# Patient Record
Sex: MALE | Race: WHITE | NOT HISPANIC OR LATINO | Employment: FULL TIME | URBAN - METROPOLITAN AREA
[De-identification: names, ages, dates, MRNs, and addresses within clinical notes are randomized per-mention and may not be internally consistent; named-entity substitution may affect disease eponyms.]

---

## 2019-10-09 ENCOUNTER — TRANSCRIBE ORDERS (OUTPATIENT)
Dept: URGENT CARE | Facility: CLINIC | Age: 59
End: 2019-10-09

## 2019-10-09 ENCOUNTER — APPOINTMENT (OUTPATIENT)
Dept: URGENT CARE | Facility: CLINIC | Age: 59
End: 2019-10-09

## 2019-10-09 VITALS
WEIGHT: 170.6 LBS | HEIGHT: 71 IN | DIASTOLIC BLOOD PRESSURE: 80 MMHG | SYSTOLIC BLOOD PRESSURE: 142 MMHG | BODY MASS INDEX: 23.88 KG/M2

## 2019-10-09 DIAGNOSIS — Z00.8 ENCOUNTER FOR BIOMETRIC SCREENING: ICD-10-CM

## 2019-10-09 DIAGNOSIS — Z00.8 ENCOUNTER FOR BIOMETRIC SCREENING: Primary | ICD-10-CM

## 2019-10-09 DIAGNOSIS — Z23 NEED FOR INFLUENZA VACCINATION: Primary | ICD-10-CM

## 2019-10-09 PROCEDURE — 80323 ALKALOIDS NOS: CPT | Performed by: NURSE PRACTITIONER

## 2019-10-15 LAB
COTININE SERPL-MCNC: NORMAL NG/ML
NICOTINE SERPL-MCNC: NORMAL NG/ML

## 2019-10-16 ENCOUNTER — TELEPHONE (OUTPATIENT)
Dept: URGENT CARE | Facility: CLINIC | Age: 59
End: 2019-10-16

## 2019-11-14 ENCOUNTER — TELEPHONE (OUTPATIENT)
Dept: FAMILY MEDICINE CLINIC | Facility: CLINIC | Age: 59
End: 2019-11-14

## 2019-11-14 NOTE — TELEPHONE ENCOUNTER
Please attempt to call pt and see if he would like to establish care  He was recently seen at urgent care and they updated PCP field with one of our providers   Thank you

## 2019-11-19 ENCOUNTER — TELEPHONE (OUTPATIENT)
Dept: FAMILY MEDICINE CLINIC | Facility: CLINIC | Age: 59
End: 2019-11-19

## 2019-11-19 NOTE — TELEPHONE ENCOUNTER
CALLED PT LET HIM A VOICEMAIL REGARDING HIS VISIT TO THE URGENT CARE AND WANTING TO ESTABLISH CARE WITH OUR OFFICE

## 2021-04-08 DIAGNOSIS — Z23 ENCOUNTER FOR IMMUNIZATION: ICD-10-CM

## 2022-05-24 ENCOUNTER — OFFICE VISIT (OUTPATIENT)
Dept: URGENT CARE | Facility: MEDICAL CENTER | Age: 62
End: 2022-05-24
Payer: COMMERCIAL

## 2022-05-24 VITALS
TEMPERATURE: 98.7 F | RESPIRATION RATE: 16 BRPM | SYSTOLIC BLOOD PRESSURE: 131 MMHG | DIASTOLIC BLOOD PRESSURE: 77 MMHG | BODY MASS INDEX: 23.1 KG/M2 | OXYGEN SATURATION: 96 % | HEART RATE: 86 BPM | WEIGHT: 165 LBS | HEIGHT: 71 IN

## 2022-05-24 DIAGNOSIS — B35.3 TINEA PEDIS OF RIGHT FOOT: Primary | ICD-10-CM

## 2022-05-24 PROCEDURE — 99213 OFFICE O/P EST LOW 20 MIN: CPT | Performed by: EMERGENCY MEDICINE

## 2022-05-24 RX ORDER — CLOTRIMAZOLE 1 %
CREAM (GRAM) TOPICAL 2 TIMES DAILY
Qty: 30 G | Refills: 0 | Status: SHIPPED | OUTPATIENT
Start: 2022-05-24

## 2022-05-24 NOTE — PATIENT INSTRUCTIONS
Athlete's Foot   WHAT YOU NEED TO KNOW:   Athlete's foot is a foot infection caused by a fungus  DISCHARGE INSTRUCTIONS:   Return to the emergency department if:   You have a fever or chills  You have red streaks going up your leg  Contact your healthcare provider if:   Your infection spreads to other parts of your body  Your infection is not better in 14 days or is not completely gone in 90 days  The skin on your foot or leg is red and hot  You have questions or concerns about your condition or care  Medicines:   Antifungal medicine  may be given as a cream or pill  You may need a doctor's order for this medicine  Take the medicine until it is gone, even if your feet look like they are healed  Take your medicine as directed  Contact your healthcare provider if you think your medicine is not helping or if you have side effects  Tell him or her if you are allergic to any medicine  Keep a list of the medicines, vitamins, and herbs you take  Include the amounts, and when and why you take them  Bring the list or the pill bottles to follow-up visits  Carry your medicine list with you in case of an emergency  Follow up with your doctor as directed:  Write down your questions so you remember to ask them during your visits  Prevent the spread of athlete's foot:   Prevent the spread of this infection to other parts of your body  When you shower, dry your groin area and other parts of your body before you dry your feet  Keep your feet clean and dry  Wash your feet each day and dry them well, especially between your toes  After your feet are dry, put powder on your feet and between your toes  Wear clean cotton or wool socks each day  Put your socks on first so you do not spread the infection to other areas of your body  Wear sandals, canvas tennis shoes, or other shoes that allow air to flow to your feet  This helps keep your feet dry   Do not use shoes that are tight, or made of plastic or rubber  Soak your feet in an astringent (drying) solution as directed  if you have blisters  You may need to do this for 20 to 30 minutes, 2 times each day to help dry out the blisters  Wear shoes in public areas  Wear shower shoes or sandals in warm, damp areas  This includes shower stalls, near swimming pools, and locker rooms  Do not share socks or shoes  Do not use public swimming pools  © Copyright [employer] 2022 Information is for End User's use only and may not be sold, redistributed or otherwise used for commercial purposes  All illustrations and images included in CareNotes® are the copyrighted property of [employer]  or [facility] Brayan Kline   The above information is an  only  It is not intended as medical advice for individual conditions or treatments  Talk to your doctor, nurse or pharmacist before following any medical regimen to see if it is safe and effective for you

## 2022-05-24 NOTE — PROGRESS NOTES
[facility]        NAME: Aristeo Duenas is a 62 y o  male  : 1960    MRN: [id number]  DATE: May 24, 2022  TIME: 5:41 PM    Assessment and Plan   Tinea pedis of right foot [B35 3]  1  Tinea pedis of right foot           Patient Instructions     Athlete's Foot   WHAT YOU NEED TO KNOW:   Athlete's foot is a foot infection caused by a fungus  DISCHARGE INSTRUCTIONS:   Return to the emergency department if:   · You have a fever or chills  · You have red streaks going up your leg  Contact your healthcare provider if:   · Your infection spreads to other parts of your body  · Your infection is not better in 14 days or is not completely gone in 90 days  · The skin on your foot or leg is red and hot  · You have questions or concerns about your condition or care  Medicines:   · Antifungal medicine  may be given as a cream or pill  You may need a doctor's order for this medicine  Take the medicine until it is gone, even if your feet look like they are healed  · Take your medicine as directed  Contact your healthcare provider if you think your medicine is not helping or if you have side effects  Tell him or her if you are allergic to any medicine  Keep a list of the medicines, vitamins, and herbs you take  Include the amounts, and when and why you take them  Bring the list or the pill bottles to follow-up visits  Carry your medicine list with you in case of an emergency  Follow up with your doctor as directed:  Write down your questions so you remember to ask them during your visits  Prevent the spread of athlete's foot:   · Prevent the spread of this infection to other parts of your body  When you shower, dry your groin area and other parts of your body before you dry your feet  · Keep your feet clean and dry  Wash your feet each day and dry them well, especially between your toes  After your feet are dry, put powder on your feet and between your toes   Wear clean cotton or wool socks each day  Put your socks on first so you do not spread the infection to other areas of your body  Wear sandals, canvas tennis shoes, or other shoes that allow air to flow to your feet  This helps keep your feet dry  Do not use shoes that are tight, or made of plastic or rubber  · Soak your feet in an astringent (drying) solution as directed  if you have blisters  You may need to do this for 20 to 30 minutes, 2 times each day to help dry out the blisters  · Wear shoes in public areas  Wear shower shoes or sandals in warm, damp areas  This includes shower stalls, near swimming pools, and locker rooms  Do not share socks or shoes  Do not use public swimming pools  © Copyright [employer] 2022 Information is for End User's use only and may not be sold, redistributed or otherwise used for commercial purposes  All illustrations and images included in CareNotes® are the copyrighted property of [employer] M [employer] Inc  or [address] [place], [place] [address]kee rj   The above information is an  only  It is not intended as medical advice for individual conditions or treatments  Talk to your doctor, nurse or pharmacist before following any medical regimen to see if it is safe and effective for you  Follow up with PCP in 3-5 days  Proceed to  ER if symptoms worsen  Chief Complaint     Chief Complaint   Patient presents with    Toe Pain     Right third toe pain x 5 days  Toe is swollen and red  Denies injury  History of Present Illness       62-year-old male presents today for evaluation of right 3rd toe pain x5 days  He states the pain started after he was doing some yd work  He denies injury  He states he had heavy boots and socks on  He has not had these symptoms previously  No fever  He has not tried anything for his symptoms  Review of Systems   Review of Systems   Constitutional: Negative for chills, fatigue and fever     HENT: Negative for postnasal drip, sore throat and trouble swallowing  Respiratory: Negative for chest tightness and shortness of breath  Cardiovascular: Negative for leg swelling  Gastrointestinal: Negative for abdominal pain  Genitourinary: Negative for dysuria  Musculoskeletal: Negative for back pain  Skin: Positive for rash (Right 3rd toe redness)  Allergic/Immunologic: Negative for immunocompromised state  Neurological: Negative for dizziness, light-headedness and headaches  Psychiatric/Behavioral: Negative for confusion  Current Medications     No current outpatient medications on file  Current Allergies     Allergies as of 05/24/2022    (No Known Allergies)            The following portions of the patient's history were reviewed and updated as appropriate: allergies, current medications, past family history, past medical history, past social history, past surgical history and problem list      History reviewed  No pertinent past medical history  History reviewed  No pertinent surgical history  No family history on file  Medications have been verified  Objective   /77   Pulse 86   Temp 98 7 °F (37 1 °C)   Resp 16   Ht 5' 11" (1 803 m)   Wt 74 8 kg (165 lb)   SpO2 96%   BMI 23 01 kg/m²        Physical Exam     Physical Exam  Vitals and nursing note reviewed  Constitutional:       Appearance: Normal appearance  He is well-developed  HENT:      Head: Normocephalic and atraumatic  Right Ear: Tympanic membrane, ear canal and external ear normal       Left Ear: Tympanic membrane, ear canal and external ear normal       Nose: Nose normal       Mouth/Throat:      Mouth: Mucous membranes are moist       Pharynx: Uvula midline  Eyes:      Pupils: Pupils are equal, round, and reactive to light  Cardiovascular:      Rate and Rhythm: Normal rate and regular rhythm  Pulmonary:      Effort: Pulmonary effort is normal       Breath sounds: Normal breath sounds     Abdominal:      General: Bowel sounds are normal       Palpations: Abdomen is soft  Tenderness: There is no abdominal tenderness  Musculoskeletal:         General: Normal range of motion  Cervical back: Normal range of motion  Skin:     General: Skin is warm and dry  Capillary Refill: Capillary refill takes less than 2 seconds  Comments: Erythema present on the right 3rd toe with excoriation and flaking present between the 3rd and 4th digits  No lymphangitis streaking  Minimally tender to palpation  Range of motion of the toes are normal   Capillary refill is less than 2 seconds  Neurological:      General: No focal deficit present  Mental Status: He is alert and oriented to person, place, and time  Comments: Patient moving all extremities equally, no focal neuro deficits noted         Psychiatric:         Mood and Affect: Mood normal          Behavior: Behavior normal

## 2024-08-29 ENCOUNTER — OFFICE VISIT (OUTPATIENT)
Dept: FAMILY MEDICINE CLINIC | Facility: CLINIC | Age: 64
End: 2024-08-29
Payer: COMMERCIAL

## 2024-08-29 VITALS
HEIGHT: 71 IN | BODY MASS INDEX: 22.26 KG/M2 | WEIGHT: 159 LBS | OXYGEN SATURATION: 97 % | DIASTOLIC BLOOD PRESSURE: 70 MMHG | SYSTOLIC BLOOD PRESSURE: 118 MMHG | HEART RATE: 71 BPM

## 2024-08-29 DIAGNOSIS — Z23 NEED FOR TETANUS BOOSTER: Primary | ICD-10-CM

## 2024-08-29 DIAGNOSIS — Z12.5 PROSTATE CANCER SCREENING: ICD-10-CM

## 2024-08-29 DIAGNOSIS — Z12.5 SCREENING FOR PROSTATE CANCER: ICD-10-CM

## 2024-08-29 DIAGNOSIS — Z12.11 SCREENING FOR COLON CANCER: ICD-10-CM

## 2024-08-29 DIAGNOSIS — Z23 ENCOUNTER FOR IMMUNIZATION: ICD-10-CM

## 2024-08-29 DIAGNOSIS — Z00.00 ANNUAL PHYSICAL EXAM: ICD-10-CM

## 2024-08-29 DIAGNOSIS — R13.10 DYSPHAGIA, UNSPECIFIED TYPE: ICD-10-CM

## 2024-08-29 DIAGNOSIS — Z11.4 SCREENING FOR HIV (HUMAN IMMUNODEFICIENCY VIRUS): ICD-10-CM

## 2024-08-29 DIAGNOSIS — Z11.59 NEED FOR HEPATITIS C SCREENING TEST: ICD-10-CM

## 2024-08-29 DIAGNOSIS — Z23 NEED FOR SHINGLES VACCINE: ICD-10-CM

## 2024-08-29 PROCEDURE — 90750 HZV VACC RECOMBINANT IM: CPT

## 2024-08-29 PROCEDURE — 99386 PREV VISIT NEW AGE 40-64: CPT | Performed by: FAMILY MEDICINE

## 2024-08-29 PROCEDURE — 3725F SCREEN DEPRESSION PERFORMED: CPT | Performed by: FAMILY MEDICINE

## 2024-08-29 PROCEDURE — 90471 IMMUNIZATION ADMIN: CPT

## 2024-08-29 PROCEDURE — 90472 IMMUNIZATION ADMIN EACH ADD: CPT

## 2024-08-29 PROCEDURE — 90715 TDAP VACCINE 7 YRS/> IM: CPT

## 2024-08-29 NOTE — ASSESSMENT & PLAN NOTE
Often with eating quickly, occurs 1-2 per month often with rice, now eating slower and this has improved, check labs today, refer to GI to consider endoscopy

## 2024-08-29 NOTE — PROGRESS NOTES
Adult Annual Physical  Name: Juanpablo Headley      : 1960      MRN: [id number]  Encounter Provider: Juan Manuel Sandoval MD  Encounter Date: 2024   Encounter department: [facility]    Assessment & Plan   1. Need for tetanus booster  2. Annual physical exam  -     Comprehensive metabolic panel; Future  -     Hemoglobin A1C; Future  -     Lipid panel; Future  -     Hepatitis C antibody; Future  -     HIV 1/2 AG/AB w Reflex [facility] for 2 yr old and above; Future  3. Need for hepatitis C screening test  4. Screening for HIV (human immunodeficiency virus)  5. Screening for colon cancer  -     Ambulatory Referral to Gastroenterology; Future  6. Screening for prostate cancer  7. Dysphagia, unspecified type  Assessment & Plan:  Often with eating quickly, occurs 1-2 per month often with rice, now eating slower and this has improved, check labs today, refer to GI to consider endoscopy  Orders:  -     Ambulatory Referral to Gastroenterology; Future  -     CBC and differential; Future  8. Prostate cancer screening  -     PSA, Total Screen; Future  9. Need for shingles vaccine  10. Encounter for immunization  -     TDAP VACCINE GREATER THAN OR EQUAL TO 6YO IM  -     Zoster Vaccine Recombinant IM    Immunizations and preventive care screenings were discussed with patient today. Appropriate education was printed on patient's after visit summary.    Discussed risks and benefits of prostate cancer screening. We discussed the controversial history of PSA screening for prostate cancer in the [place] as well as the risk of over detection and over treatment of prostate cancer by way of PSA screening.  The patient understands that PSA blood testing is an imperfect way to screen for prostate cancer and that elevated PSA levels in the blood may also be caused by infection, inflammation, prostatic trauma or manipulation, urological procedures, or by benign prostatic enlargement.    The role  "of the digital rectal examination in prostate cancer screening was also discussed and I discussed with him that there is large interobserver variability in the findings of digital rectal examination.    Overall doing well has not seen a doctor in many years, check and refer for routine screneings.    Advised to reduce alchohol consumption  Counseling:  Alcohol/drug use: discussed moderation in alcohol intake, the recommendations for healthy alcohol use, and avoidance of illicit drug use.  Dental Health: discussed importance of regular tooth brushing, flossing, and dental visits.  Injury prevention: discussed safety/seat belts, safety helmets, smoke detectors, carbon dioxide detectors, and smoking near bedding or upholstery.  Sexual health: discussed sexually transmitted diseases, partner selection, use of condoms, avoidance of unintended pregnancy, and contraceptive alternatives.  Exercise: the importance of regular exercise/physical activity was discussed. Recommend exercise 3-5 times per week for at least 30 minutes.       Depression Screening and Follow-up Plan: Patient was screened for depression during today's encounter. They screened negative with a PHQ-2 score of 0.        History of Present Illness     63 year old male presenting for physical    Overall doing well reports difficulty swallowing over the past few motnhs especially with rice    Feels as if food is stuck in throat has to cough it out or sometimes resolves with water        Adult Annual Physical  Review of Systems   Constitutional:  Negative for activity change and appetite change.   Respiratory:  Negative for apnea and chest tightness.    Cardiovascular:  Negative for chest pain and palpitations.   Gastrointestinal:  Negative for abdominal distention and abdominal pain.   Musculoskeletal:  Negative for arthralgias and back pain.         Objective     /70   Pulse 71   Ht 5' 11\" (1.803 m)   Wt 72.1 kg (159 lb)   SpO2 97%   BMI 22.18 " kg/m²     Physical Exam  Constitutional:       Appearance: Normal appearance.   Cardiovascular:      Rate and Rhythm: Normal rate and regular rhythm.      Pulses: Normal pulses.      Heart sounds: Normal heart sounds.   Pulmonary:      Effort: Pulmonary effort is normal.      Breath sounds: Normal breath sounds.   Abdominal:      General: Abdomen is flat.   Musculoskeletal:         General: Normal range of motion.   Neurological:      General: No focal deficit present.      Mental Status: He is alert and oriented to person, place, and time.

## 2024-08-30 ENCOUNTER — APPOINTMENT (OUTPATIENT)
Dept: LAB | Facility: MEDICAL CENTER | Age: 64
End: 2024-08-30
Payer: COMMERCIAL

## 2024-08-30 DIAGNOSIS — Z00.00 ANNUAL PHYSICAL EXAM: ICD-10-CM

## 2024-08-30 DIAGNOSIS — Z12.5 PROSTATE CANCER SCREENING: ICD-10-CM

## 2024-08-30 DIAGNOSIS — R13.10 DYSPHAGIA, UNSPECIFIED TYPE: ICD-10-CM

## 2024-08-30 LAB
ALBUMIN SERPL BCG-MCNC: 4 G/DL (ref 3.5–5)
ALP SERPL-CCNC: 61 U/L (ref 34–104)
ALT SERPL W P-5'-P-CCNC: 23 U/L (ref 7–52)
ANION GAP SERPL CALCULATED.3IONS-SCNC: 8 MMOL/L (ref 4–13)
AST SERPL W P-5'-P-CCNC: 19 U/L (ref 13–39)
BASOPHILS # BLD AUTO: 0.02 THOUSANDS/ÂΜL (ref 0–0.1)
BASOPHILS NFR BLD AUTO: 0 % (ref 0–1)
BILIRUB SERPL-MCNC: 0.86 MG/DL (ref 0.2–1)
BUN SERPL-MCNC: 8 MG/DL (ref 5–25)
CALCIUM SERPL-MCNC: 8.8 MG/DL (ref 8.4–10.2)
CHLORIDE SERPL-SCNC: 104 MMOL/L (ref 96–108)
CHOLEST SERPL-MCNC: 175 MG/DL
CO2 SERPL-SCNC: 26 MMOL/L (ref 21–32)
CREAT SERPL-MCNC: 0.9 MG/DL (ref 0.6–1.3)
EOSINOPHIL # BLD AUTO: 0.09 THOUSAND/ÂΜL (ref 0–0.61)
EOSINOPHIL NFR BLD AUTO: 1 % (ref 0–6)
ERYTHROCYTE [DISTWIDTH] IN BLOOD BY AUTOMATED COUNT: 13 % (ref 11.6–15.1)
EST. AVERAGE GLUCOSE BLD GHB EST-MCNC: 97 MG/DL
GFR SERPL CREATININE-BSD FRML MDRD: 91 ML/MIN/1.73SQ M
GLUCOSE P FAST SERPL-MCNC: 95 MG/DL (ref 65–99)
HBA1C MFR BLD: 5 %
HCT VFR BLD AUTO: 45.8 % (ref 36.5–49.3)
HCV AB SER QL: NORMAL
HDLC SERPL-MCNC: 31 MG/DL
HGB BLD-MCNC: 15.7 G/DL (ref 12–17)
HIV 1+2 AB+HIV1 P24 AG SERPL QL IA: NORMAL
HIV 2 AB SERPL QL IA: NORMAL
HIV1 AB SERPL QL IA: NORMAL
HIV1 P24 AG SERPL QL IA: NORMAL
IMM GRANULOCYTES # BLD AUTO: 0.01 THOUSAND/UL (ref 0–0.2)
IMM GRANULOCYTES NFR BLD AUTO: 0 % (ref 0–2)
LDLC SERPL CALC-MCNC: 119 MG/DL (ref 0–100)
LYMPHOCYTES # BLD AUTO: 1.15 THOUSANDS/ÂΜL (ref 0.6–4.47)
LYMPHOCYTES NFR BLD AUTO: 15 % (ref 14–44)
MCH RBC QN AUTO: 34.3 PG (ref 26.8–34.3)
MCHC RBC AUTO-ENTMCNC: 34.3 G/DL (ref 31.4–37.4)
MCV RBC AUTO: 100 FL (ref 82–98)
MONOCYTES # BLD AUTO: 0.65 THOUSAND/ÂΜL (ref 0.17–1.22)
MONOCYTES NFR BLD AUTO: 9 % (ref 4–12)
NEUTROPHILS # BLD AUTO: 5.7 THOUSANDS/ÂΜL (ref 1.85–7.62)
NEUTS SEG NFR BLD AUTO: 75 % (ref 43–75)
NONHDLC SERPL-MCNC: 144 MG/DL
NRBC BLD AUTO-RTO: 0 /100 WBCS
PLATELET # BLD AUTO: 228 THOUSANDS/UL (ref 149–390)
PMV BLD AUTO: 9.2 FL (ref 8.9–12.7)
POTASSIUM SERPL-SCNC: 4.4 MMOL/L (ref 3.5–5.3)
PROT SERPL-MCNC: 6.2 G/DL (ref 6.4–8.4)
PSA SERPL-MCNC: 2.34 NG/ML (ref 0–4)
RBC # BLD AUTO: 4.58 MILLION/UL (ref 3.88–5.62)
SODIUM SERPL-SCNC: 138 MMOL/L (ref 135–147)
TRIGL SERPL-MCNC: 123 MG/DL
WBC # BLD AUTO: 7.62 THOUSAND/UL (ref 4.31–10.16)

## 2024-08-30 PROCEDURE — 80053 COMPREHEN METABOLIC PANEL: CPT

## 2024-08-30 PROCEDURE — 85025 COMPLETE CBC W/AUTO DIFF WBC: CPT

## 2024-08-30 PROCEDURE — 87389 HIV-1 AG W/HIV-1&-2 AB AG IA: CPT

## 2024-08-30 PROCEDURE — 36415 COLL VENOUS BLD VENIPUNCTURE: CPT

## 2024-08-30 PROCEDURE — G0103 PSA SCREENING: HCPCS

## 2024-08-30 PROCEDURE — 80061 LIPID PANEL: CPT

## 2024-08-30 PROCEDURE — 83036 HEMOGLOBIN GLYCOSYLATED A1C: CPT

## 2024-08-30 PROCEDURE — 86803 HEPATITIS C AB TEST: CPT

## 2024-11-21 ENCOUNTER — CLINICAL SUPPORT (OUTPATIENT)
Dept: FAMILY MEDICINE CLINIC | Facility: CLINIC | Age: 64
End: 2024-11-21
Payer: COMMERCIAL

## 2024-11-21 DIAGNOSIS — Z23 ENCOUNTER FOR IMMUNIZATION: Primary | ICD-10-CM

## 2024-11-21 PROCEDURE — 90750 HZV VACC RECOMBINANT IM: CPT

## 2024-11-21 PROCEDURE — 90471 IMMUNIZATION ADMIN: CPT

## 2025-06-30 ENCOUNTER — VBI (OUTPATIENT)
Dept: ADMINISTRATIVE | Facility: OTHER | Age: 65
End: 2025-06-30

## 2025-06-30 NOTE — TELEPHONE ENCOUNTER
06/30/25 3:24 PM     Chart reviewed for CRC: Colonoscopy was/were not submitted to the patient's insurance.     Fatoumata Bergeron MA   [facility]